# Patient Record
Sex: MALE | Race: WHITE | NOT HISPANIC OR LATINO | Employment: FULL TIME | URBAN - METROPOLITAN AREA
[De-identification: names, ages, dates, MRNs, and addresses within clinical notes are randomized per-mention and may not be internally consistent; named-entity substitution may affect disease eponyms.]

---

## 2017-11-14 NOTE — PROGRESS NOTES
SUBJECTIVE:                                                    James Rios is a 42 year old male who presents to clinic today for the following health issues:      HPI    Concern - right sided abd pain  Onset: Feburary    Description:   Has had a very light uncomfortable pain in right side since February but in the last few days the sensation has doubled and has now wrapped around his back to the other side as well.   - the only urine changes he notices lately is increased odor, no blood, burning or decrease in flow.       Intensity: moderate    Progression of Symptoms:  worsening    Therapies Tried and outcome: none     Patient states that he has noticed increased odor to urine. Denies changes to stream, increased urgency or frequency.   He is a  he work full time during summer and fall.   He states he drinks about 1/2 pot of coffee per day. Does not drink a lot of water. Drinks about 4-5 beers per day. Denies smoking.     Problem list and histories reviewed & adjusted, as indicated.  Additional history: as documented    BP Readings from Last 3 Encounters:   11/15/17 116/70   11/16/15 130/76   09/10/15 122/80    Wt Readings from Last 3 Encounters:   11/15/17 206 lb (93.4 kg)   11/16/15 194 lb 9.6 oz (88.3 kg)   09/10/15 197 lb 6.4 oz (89.5 kg)        Labs reviewed in EPIC      ROS:  Constitutional, HEENT, cardiovascular, pulmonary, gi and gu systems are negative, except as otherwise noted.      OBJECTIVE:   /70 (BP Location: Left arm, Patient Position: Chair, Cuff Size: Adult Large)  Pulse 84  Temp 98.4  F (36.9  C) (Oral)  Resp 16  Wt 206 lb (93.4 kg)  BMI 28.73 kg/m2  Body mass index is 28.73 kg/(m^2).  GENERAL: healthy, alert and no distress  NECK: no adenopathy, no asymmetry, masses, or scars and thyroid normal to palpation  RESP: lungs clear to auscultation - no rales, rhonchi or wheezes  CV: regular rate and rhythm, normal S1 S2, no S3 or S4, no murmur, click or rub, no peripheral  edema and peripheral pulses strong  ABDOMEN: tenderness right side pain with palpation near T-9 below rib line, milder tenderness over same area on left side.No organomegaly or masses, bowel sounds normal, no palpable or pulsatile masses, no bruits heard, no palpable renal abnormalities  and no scars, striae, dilated veins, rashes, or lesions  MS: no gross musculoskeletal defects noted, no edema  SKIN: no suspicious lesions or rashes  NEURO: Normal strength and tone, mentation intact and speech normal  BACK: no CVA tenderness, no paralumbar tenderness    Diagnostic Test Results:  none     ASSESSMENT/PLAN:     1. Chronic right-sided thoracic back pain  Patient states he is concerned about the cost of care today as he is pain out of pocket. Discussed workup to include CBC to check for anemia, infection, CMP for kidney and liver evaluation as well as electrolyte and glucose.  - Comprehensive metabolic panel  - CBC with platelets and differential    2. Acute left-sided thoracic back pain    - Comprehensive metabolic panel  - CBC with platelets and differential    3. Bad odor of urine  We'll rule out urinary tract infection  - *UA reflex to Microscopic and Culture ([place] [facility] (except [place] and [place])    Discussed that he may need imaging done if lab workup is negative. Would recommend abdominal ultrasound for further evaluation of hepatobiliary and renal.   He consumes 5-6 beers daily increasing his rist for liver dysfunction. He is a  and drinks a lot of caffeine increasing risk for renal dysfunction. It is possible that pain is due to costochondritis but it would be atypical to have bilateral locations as he is presenting with.         4. Need for prophylactic vaccination and inoculation against influenza  Declined due to cost    5. Need for prophylactic vaccination with tetanus-diphtheria (TD)  Declined due to cost    6. Need for lipid screening    - Lipid panel reflex to direct LDL  Non-fasting  - Urine Microscopic    Discussed filling out information for mentioned sure as it is open enrollment at this time. Reviewed how to do this with the patient and encouraged him to do this on his own when he has time. He states his girlfriend does have a computer that he is able to use.    Patient Instructions   I will call you with lab result and any additional testing as needed.     Thank you  Jyoti Beregr [facility]

## 2017-11-15 ENCOUNTER — OFFICE VISIT (OUTPATIENT)
Dept: FAMILY MEDICINE | Facility: OTHER | Age: 42
End: 2017-11-15

## 2017-11-15 ENCOUNTER — TELEPHONE (OUTPATIENT)
Dept: FAMILY MEDICINE | Facility: OTHER | Age: 42
End: 2017-11-15

## 2017-11-15 VITALS
RESPIRATION RATE: 16 BRPM | SYSTOLIC BLOOD PRESSURE: 116 MMHG | HEART RATE: 84 BPM | TEMPERATURE: 98.4 F | BODY MASS INDEX: 28.73 KG/M2 | WEIGHT: 206 LBS | DIASTOLIC BLOOD PRESSURE: 70 MMHG

## 2017-11-15 DIAGNOSIS — M54.6 ACUTE LEFT-SIDED THORACIC BACK PAIN: ICD-10-CM

## 2017-11-15 DIAGNOSIS — M54.6 ACUTE LEFT-SIDED THORACIC BACK PAIN: Primary | ICD-10-CM

## 2017-11-15 DIAGNOSIS — Z23 NEED FOR PROPHYLACTIC VACCINATION AND INOCULATION AGAINST INFLUENZA: ICD-10-CM

## 2017-11-15 DIAGNOSIS — M54.6 CHRONIC RIGHT-SIDED THORACIC BACK PAIN: Primary | ICD-10-CM

## 2017-11-15 DIAGNOSIS — Z23 NEED FOR PROPHYLACTIC VACCINATION WITH TETANUS-DIPHTHERIA (TD): ICD-10-CM

## 2017-11-15 DIAGNOSIS — Z13.220 NEED FOR LIPID SCREENING: ICD-10-CM

## 2017-11-15 DIAGNOSIS — M54.6 CHRONIC RIGHT-SIDED THORACIC BACK PAIN: ICD-10-CM

## 2017-11-15 DIAGNOSIS — R82.90 BAD ODOR OF URINE: ICD-10-CM

## 2017-11-15 DIAGNOSIS — G89.29 CHRONIC RIGHT-SIDED THORACIC BACK PAIN: Primary | ICD-10-CM

## 2017-11-15 DIAGNOSIS — G89.29 CHRONIC RIGHT-SIDED THORACIC BACK PAIN: ICD-10-CM

## 2017-11-15 LAB
ALBUMIN SERPL-MCNC: 3.6 G/DL (ref 3.4–5)
ALBUMIN UR-MCNC: 100 MG/DL
ALP SERPL-CCNC: 51 U/L (ref 40–150)
ALT SERPL W P-5'-P-CCNC: 27 U/L (ref 0–70)
ANION GAP SERPL CALCULATED.3IONS-SCNC: 4 MMOL/L (ref 3–14)
APPEARANCE UR: CLEAR
AST SERPL W P-5'-P-CCNC: 14 U/L (ref 0–45)
BASOPHILS # BLD AUTO: 0 10E9/L (ref 0–0.2)
BASOPHILS NFR BLD AUTO: 0.4 %
BILIRUB SERPL-MCNC: 0.6 MG/DL (ref 0.2–1.3)
BILIRUB UR QL STRIP: NEGATIVE
BUN SERPL-MCNC: 18 MG/DL (ref 7–30)
CALCIUM SERPL-MCNC: 9.2 MG/DL (ref 8.5–10.1)
CHLORIDE SERPL-SCNC: 105 MMOL/L (ref 94–109)
CHOLEST SERPL-MCNC: 196 MG/DL
CO2 SERPL-SCNC: 29 MMOL/L (ref 20–32)
COLOR UR AUTO: YELLOW
CREAT SERPL-MCNC: 0.79 MG/DL (ref 0.66–1.25)
DIFFERENTIAL METHOD BLD: ABNORMAL
EOSINOPHIL # BLD AUTO: 0.1 10E9/L (ref 0–0.7)
EOSINOPHIL NFR BLD AUTO: 0.7 %
ERYTHROCYTE [DISTWIDTH] IN BLOOD BY AUTOMATED COUNT: 12.6 % (ref 10–15)
GFR SERPL CREATININE-BSD FRML MDRD: >90 ML/MIN/1.7M2
GLUCOSE SERPL-MCNC: 77 MG/DL (ref 70–99)
GLUCOSE UR STRIP-MCNC: NEGATIVE MG/DL
HCT VFR BLD AUTO: 44.8 % (ref 40–53)
HDLC SERPL-MCNC: 70 MG/DL
HGB BLD-MCNC: 14.9 G/DL (ref 13.3–17.7)
HGB UR QL STRIP: NEGATIVE
KETONES UR STRIP-MCNC: NEGATIVE MG/DL
LDLC SERPL CALC-MCNC: 108 MG/DL
LEUKOCYTE ESTERASE UR QL STRIP: NEGATIVE
LYMPHOCYTES # BLD AUTO: 1.8 10E9/L (ref 0.8–5.3)
LYMPHOCYTES NFR BLD AUTO: 26.2 %
MCH RBC QN AUTO: 33.2 PG (ref 26.5–33)
MCHC RBC AUTO-ENTMCNC: 33.3 G/DL (ref 31.5–36.5)
MCV RBC AUTO: 100 FL (ref 78–100)
MONOCYTES # BLD AUTO: 0.8 10E9/L (ref 0–1.3)
MONOCYTES NFR BLD AUTO: 11.2 %
NEUTROPHILS # BLD AUTO: 4.1 10E9/L (ref 1.6–8.3)
NEUTROPHILS NFR BLD AUTO: 61.5 %
NITRATE UR QL: NEGATIVE
NONHDLC SERPL-MCNC: 126 MG/DL
PH UR STRIP: 6.5 PH (ref 5–7)
PLATELET # BLD AUTO: 290 10E9/L (ref 150–450)
POTASSIUM SERPL-SCNC: 4.8 MMOL/L (ref 3.4–5.3)
PROT SERPL-MCNC: 7.9 G/DL (ref 6.8–8.8)
RBC # BLD AUTO: 4.49 10E12/L (ref 4.4–5.9)
RBC #/AREA URNS AUTO: NORMAL /HPF
SODIUM SERPL-SCNC: 138 MMOL/L (ref 133–144)
SOURCE: ABNORMAL
SP GR UR STRIP: 1.02 (ref 1–1.03)
TRIGL SERPL-MCNC: 88 MG/DL
UROBILINOGEN UR STRIP-ACNC: 0.2 EU/DL (ref 0.2–1)
WBC # BLD AUTO: 6.7 10E9/L (ref 4–11)
WBC #/AREA URNS AUTO: NORMAL /HPF

## 2017-11-15 PROCEDURE — 36415 COLL VENOUS BLD VENIPUNCTURE: CPT | Performed by: NURSE PRACTITIONER

## 2017-11-15 PROCEDURE — 80061 LIPID PANEL: CPT | Performed by: NURSE PRACTITIONER

## 2017-11-15 PROCEDURE — 80053 COMPREHEN METABOLIC PANEL: CPT | Performed by: NURSE PRACTITIONER

## 2017-11-15 PROCEDURE — 99213 OFFICE O/P EST LOW 20 MIN: CPT | Performed by: NURSE PRACTITIONER

## 2017-11-15 PROCEDURE — 81001 URINALYSIS AUTO W/SCOPE: CPT | Performed by: NURSE PRACTITIONER

## 2017-11-15 PROCEDURE — 85025 COMPLETE CBC W/AUTO DIFF WBC: CPT | Performed by: NURSE PRACTITIONER

## 2017-11-15 NOTE — NURSING NOTE
"Chief Complaint   Patient presents with     Flank Pain     Health Maintenance     height, tdap, lipid screening, mychart       Initial /70 (BP Location: Left arm, Patient Position: Chair, Cuff Size: Adult Large)  Pulse 84  Temp 98.4  F (36.9  C) (Oral)  Resp 16  Wt 206 lb (93.4 kg)  BMI 28.73 kg/m2 Estimated body mass index is 28.73 kg/(m^2) as calculated from the following:    Height as of 9/10/15: 5' 11\" (1.803 m).    Weight as of this encounter: 206 lb (93.4 kg).  Medication Reconciliation: complete    "

## 2017-11-15 NOTE — MR AVS SNAPSHOT
"              After Visit Summary   11/15/2017    James Rios    MRN: [id number]           Patient Information     Date Of Birth          1975        Visit Information        Provider Department      11/15/2017 9:20 AM Jyoti Berger APRN CNP [facility]        Today's Diagnoses     Need for lipid screening    -  1    Need for prophylactic vaccination and inoculation against influenza        Need for prophylactic vaccination with tetanus-diphtheria (TD)        Bad odor of urine        Chronic right-sided thoracic back pain        Acute left-sided thoracic back pain          Care Instructions    I will call you with lab result and any additional testing as needed.     Thank you  Jyoti Berger CNP            Follow-ups after your visit        Follow-up notes from your care team     Return if symptoms worsen or fail to improve.      Who to contact     If you have questions or need follow up information about today's clinic visit or your schedule please contact [facility] directly at [phone].  Normal or non-critical lab and imaging results will be communicated to you by MyChart, letter or phone within 4 business days after the clinic has received the results. If you do not hear from us within 7 days, please contact the clinic through SnapHealth[employer] or phone. If you have a critical or abnormal lab result, we will notify you by phone as soon as possible.  Submit refill requests through Errplane or call your pharmacy and they will forward the refill request to us. Please allow 3 business days for your refill to be completed.          Additional Information About Your Visit        Snap[employer]hart Information     Errplane lets you send messages to your doctor, view your test results, renew your prescriptions, schedule appointments and more. To sign up, go to [email or web address] . Click on \"Log in\" on the left side of the screen, which will take you to the Welcome page. Then click " "on \"Sign up Now\" on the right side of the page.     You will be asked to enter the access code listed below, as well as some personal information. Please follow the directions to create your username and password.     Your access code is: [email or web address]  Expires: 2018  9:50 AM     Your access code will  in 90 days. If you need help or a new code, please call your [place] clinic or [phone].        Care EveryWhere ID     This is your Care EveryWhere ID. This could be used by other organizations to access your [place] medical records  [id number]        Your Vitals Were     Pulse Temperature Respirations BMI (Body Mass Index)          84 98.4  F (36.9  C) (Oral) 16 28.73 kg/m2         Blood Pressure from Last 3 Encounters:   11/15/17 116/70   11/16/15 130/76   09/10/15 122/80    Weight from Last 3 Encounters:   11/15/17 206 lb (93.4 kg)   11/16/15 194 lb 9.6 oz (88.3 kg)   09/10/15 197 lb 6.4 oz (89.5 kg)              We Performed the Following     *UA reflex to Microscopic and Culture ([place] and [facility] (except [place] and [place])     CBC with platelets and differential     Comprehensive metabolic panel     Lipid panel reflex to direct LDL Non-fasting        Primary Care Provider Office Phone # Fax #    Ramsey Thompson [phone]       XXX RESIGNED  [address] [facility] [address]        Equal Access to Services     LARISA PENA : Hadii aad ku hadasho Soomaali, waaxda luqadaha, qaybta kaalmada adeegyada, megan jean baptiste. So [facility] [phone].    ATENCIÓN: Si kyleela jcañol, tiene a eller disposición servicios gratuitos de asistencia lingüística. Llame al [phone].    We comply with applicable federal civil rights laws and [place] laws. We do not discriminate on the basis of race, color, national origin, age, disability, sex, sexual orientation, or gender identity.            Thank you!     Thank you for choosing [facility]  for your care. Our goal is always to provide you with excellent care. Hearing back from our patients is one way we can continue to improve our services. Please take a few minutes to complete the written survey that you may receive in the mail after your visit with us. Thank you!             Your Updated Medication List - Protect others around you: Learn how to safely use, store and throw away your medicines at www.disposemymeds.org.      Notice  As of 11/15/2017  9:50 AM    You have not been prescribed any medications.

## 2017-11-15 NOTE — PATIENT INSTRUCTIONS
I will call you with lab result and any additional testing as needed.     Thank you  Jyoti Berger CNP

## 2017-11-15 NOTE — TELEPHONE ENCOUNTER
Pt had called back and he would like to have an ultrasound. Please place orders and call pt when orders are placed.

## 2017-11-15 NOTE — PROGRESS NOTES
Please advise James Rios,  1975, that his lab results were urine negative for infection, cholesterol panel is normal, electrolytes kidneys and liver are all normal, complete blood count does not show any sign of anemia, bacterial or viral infection. Would consider ultrasound if continue to have symptoms.   [phone] (home)   Jyoti Berger

## 2017-11-15 NOTE — TELEPHONE ENCOUNTER
----- Message from Jyoti Berger, JORDYN CNP sent at 11/15/2017  3:25 PM CST -----  Please advise James Rios,  1975, that his lab results were urine negative for infection, cholesterol panel is normal, electrolytes kidneys and liver are all normal, complete blood count does not show any sign of anemia, bacterial or viral infection. Would consider ultrasound if continue to have symptoms.   [phone] (home)   Jyoti Berger

## 2017-11-17 NOTE — TELEPHONE ENCOUNTER
Informed patient that ultra sound orders are in. And he can wait til Dec. 1st when he has insurance. Informed WS of this as well and she was fine with him waiting.

## 2018-03-13 NOTE — PROGRESS NOTES
SUBJECTIVE:   James Rios is a 42 year old male who presents to clinic today for the following health issues:      HPI     FLANK PAIN     Onset: since the Fall (did see WS in November, pt states it's worsening)    Description:   Character: Dull ache  Location: right flank  Radiation: starting to feel it in the other side as well     Intensity: mild, moderate    Progression of Symptoms:  worsening    Accompanying Signs & Symptoms:  Fever/Chills?: no   Gas/Bloating: no   Nausea: no   Vomitting: no   Diarrhea?: no   Constipation:no   Dysuria or Hematuria: urine is strong in odor but color is ok    History:   Trauma: no   Previous similar pain: no    Previous tests done: WS did some labs in Nov, everything was NEG. She did suggest an MRI but pt did not have insurance at the time.     Precipitating factors:   Does the pain change with:     Food: no      BM: no     Urination: no     Alleviating factors:  Drinking more water, unsure if it helps     Therapies Tried and outcome: none     LMP:  not applicable     Problem list and histories reviewed & adjusted, as indicated.  Additional history: as documented    Pain has been worsening in the last week (stronger). Previously was manageable. Pain does not radiate. Good appetite. Uncomfortable to sleep. Not used anything other than beer for pain, it numbs the pain.  Bilateral - R>L     No current outpatient prescriptions on file.     BP Readings from Last 3 Encounters:   03/14/18 112/76   11/15/17 116/70   11/16/15 130/76    Wt Readings from Last 3 Encounters:   03/14/18 207 lb (93.9 kg)   11/15/17 206 lb (93.4 kg)   11/16/15 194 lb 9.6 oz (88.3 kg)                    ROS:  CONSTITUTIONAL: NEGATIVE for fever, chills, change in weight  ENT/MOUTH: NEGATIVE for ear, mouth and throat problems  RESP: NEGATIVE for significant cough or SOB  CV: NEGATIVE for chest pain, palpitations or peripheral edema  GI: NEGATIVE for nausea, abdominal pain, heartburn, or change in bowel  "habits  : negative for dysuria, hematuria, decreased urinary stream, erectile dysfunction and positive for strong urine order.     OBJECTIVE:     /76 (BP Location: Left arm, Patient Position: Chair, Cuff Size: Adult Regular)  Pulse 72  Temp 98.3  F (36.8  C) (Oral)  Resp 16  Ht 5' 10.87\" (1.8 m)  Wt 207 lb (93.9 kg)  BMI 28.98 kg/m2  Body mass index is 28.98 kg/(m^2).  Physical Exam   Constitutional: Vital signs are normal.   Cardiovascular: Normal rate, regular rhythm and normal heart sounds.    Pulmonary/Chest: Effort normal and breath sounds normal.   Abdominal: Soft. Normal appearance and bowel sounds are normal. There is no tenderness. There is no CVA tenderness.   Musculoskeletal:        Lumbar back: He exhibits tenderness and pain. He exhibits normal range of motion, no bony tenderness and no spasm.        Back:    Negative Straight leg raise.    Skin: Skin is warm, dry and intact.         Diagnostic Test Results:  Results for orders placed or performed in visit on 03/14/18   *UA reflex to Microscopic and Culture ([place] and [facility] (except [place] and [place])   Result Value Ref Range    Color Urine Yellow     Appearance Urine Clear     Glucose Urine Negative NEG^Negative mg/dL    Bilirubin Urine Negative NEG^Negative    Ketones Urine Negative NEG^Negative mg/dL    Specific Gravity Urine 1.015 1.003 - 1.035    Blood Urine Negative NEG^Negative    pH Urine 7.0 5.0 - 7.0 pH    Protein Albumin Urine 30 (A) NEG^Negative mg/dL    Urobilinogen Urine 0.2 0.2 - 1.0 EU/dL    Nitrite Urine Negative NEG^Negative    Leukocyte Esterase Urine Negative NEG^Negative    Source Unspecified Urine    PSA, screen   Result Value Ref Range    PSA 1.18 0 - 4 ug/L   Comprehensive metabolic panel   Result Value Ref Range    Sodium 139 133 - 144 mmol/L    Potassium 4.7 3.4 - 5.3 mmol/L    Chloride 103 94 - 109 mmol/L    Carbon Dioxide 26 20 - 32 mmol/L    Anion Gap 10 3 - 14 mmol/L    Glucose 102 (H) 70 - 99 " mg/dL    Urea Nitrogen 23 7 - 30 mg/dL    Creatinine 0.78 0.66 - 1.25 mg/dL    GFR Estimate >90 >60 mL/min/1.7m2    GFR Estimate If Black >90 >60 mL/min/1.7m2    Calcium 9.2 8.5 - 10.1 mg/dL    Bilirubin Total 0.6 0.2 - 1.3 mg/dL    Albumin 4.2 3.4 - 5.0 g/dL    Protein Total 8.7 6.8 - 8.8 g/dL    Alkaline Phosphatase 51 40 - 150 U/L    ALT 38 0 - 70 U/L    AST 22 0 - 45 U/L   CBC with platelets   Result Value Ref Range    WBC 7.3 4.0 - 11.0 10e9/L    RBC Count 4.67 4.4 - 5.9 10e12/L    Hemoglobin 15.4 13.3 - 17.7 g/dL    Hematocrit 46.3 40.0 - 53.0 %    MCV 99 78 - 100 fl    MCH 33.0 26.5 - 33.0 pg    MCHC 33.3 31.5 - 36.5 g/dL    RDW 12.9 10.0 - 15.0 %    Platelet Count 278 150 - 450 10e9/L   Urine Microscopic   Result Value Ref Range    WBC Urine 0 - 5 OTO5^0 - 5 /HPF    RBC Urine O - 2 OTO2^O - 2 /HPF       ASSESSMENT/PLAN:         ICD-10-CM    1. Flank pain R10.9 *UA reflex to Microscopic and Culture ([place] and [facility] (except [place] and [place])     PSA, screen     Comprehensive metabolic panel     CBC with platelets     Urine Microscopic       - Ongoing chronic flank pain. Patient requesting MRI. At this time I do not see a need for this as there is no spinal tenderness. I do recommend an abdominal ultrasound or CT. Given this is chronic I have advised for a CT. No elevated white count which is reassuring and urine is normal. Pain could be musculoskeletal at this time. Continue with tylenol, ice or heat and follow up for imaging.         The patient indicates understanding of these issues and agrees with the plan.    There are no Patient Instructions on file for this visit.    JORDYN Bell [facility]

## 2018-03-14 ENCOUNTER — TELEPHONE (OUTPATIENT)
Dept: FAMILY MEDICINE | Facility: OTHER | Age: 43
End: 2018-03-14

## 2018-03-14 ENCOUNTER — OFFICE VISIT (OUTPATIENT)
Dept: FAMILY MEDICINE | Facility: OTHER | Age: 43
End: 2018-03-14
Payer: COMMERCIAL

## 2018-03-14 VITALS
BODY MASS INDEX: 28.98 KG/M2 | TEMPERATURE: 98.3 F | RESPIRATION RATE: 16 BRPM | WEIGHT: 207 LBS | SYSTOLIC BLOOD PRESSURE: 112 MMHG | DIASTOLIC BLOOD PRESSURE: 76 MMHG | HEART RATE: 72 BPM | HEIGHT: 71 IN

## 2018-03-14 DIAGNOSIS — R10.9 FLANK PAIN: Primary | ICD-10-CM

## 2018-03-14 DIAGNOSIS — R10.9 CHRONIC FLANK PAIN: Primary | ICD-10-CM

## 2018-03-14 DIAGNOSIS — G89.29 CHRONIC FLANK PAIN: Primary | ICD-10-CM

## 2018-03-14 LAB
ALBUMIN SERPL-MCNC: 4.2 G/DL (ref 3.4–5)
ALBUMIN UR-MCNC: 30 MG/DL
ALP SERPL-CCNC: 51 U/L (ref 40–150)
ALT SERPL W P-5'-P-CCNC: 38 U/L (ref 0–70)
ANION GAP SERPL CALCULATED.3IONS-SCNC: 10 MMOL/L (ref 3–14)
APPEARANCE UR: CLEAR
AST SERPL W P-5'-P-CCNC: 22 U/L (ref 0–45)
BILIRUB SERPL-MCNC: 0.6 MG/DL (ref 0.2–1.3)
BILIRUB UR QL STRIP: NEGATIVE
BUN SERPL-MCNC: 23 MG/DL (ref 7–30)
CALCIUM SERPL-MCNC: 9.2 MG/DL (ref 8.5–10.1)
CHLORIDE SERPL-SCNC: 103 MMOL/L (ref 94–109)
CO2 SERPL-SCNC: 26 MMOL/L (ref 20–32)
COLOR UR AUTO: YELLOW
CREAT SERPL-MCNC: 0.78 MG/DL (ref 0.66–1.25)
ERYTHROCYTE [DISTWIDTH] IN BLOOD BY AUTOMATED COUNT: 12.9 % (ref 10–15)
GFR SERPL CREATININE-BSD FRML MDRD: >90 ML/MIN/1.7M2
GLUCOSE SERPL-MCNC: 102 MG/DL (ref 70–99)
GLUCOSE UR STRIP-MCNC: NEGATIVE MG/DL
HCT VFR BLD AUTO: 46.3 % (ref 40–53)
HGB BLD-MCNC: 15.4 G/DL (ref 13.3–17.7)
HGB UR QL STRIP: NEGATIVE
KETONES UR STRIP-MCNC: NEGATIVE MG/DL
LEUKOCYTE ESTERASE UR QL STRIP: NEGATIVE
MCH RBC QN AUTO: 33 PG (ref 26.5–33)
MCHC RBC AUTO-ENTMCNC: 33.3 G/DL (ref 31.5–36.5)
MCV RBC AUTO: 99 FL (ref 78–100)
NITRATE UR QL: NEGATIVE
PH UR STRIP: 7 PH (ref 5–7)
PLATELET # BLD AUTO: 278 10E9/L (ref 150–450)
POTASSIUM SERPL-SCNC: 4.7 MMOL/L (ref 3.4–5.3)
PROT SERPL-MCNC: 8.7 G/DL (ref 6.8–8.8)
PSA SERPL-ACNC: 1.18 UG/L (ref 0–4)
RBC # BLD AUTO: 4.67 10E12/L (ref 4.4–5.9)
RBC #/AREA URNS AUTO: NORMAL /HPF
SODIUM SERPL-SCNC: 139 MMOL/L (ref 133–144)
SOURCE: ABNORMAL
SP GR UR STRIP: 1.01 (ref 1–1.03)
UROBILINOGEN UR STRIP-ACNC: 0.2 EU/DL (ref 0.2–1)
WBC # BLD AUTO: 7.3 10E9/L (ref 4–11)
WBC #/AREA URNS AUTO: NORMAL /HPF

## 2018-03-14 PROCEDURE — 80053 COMPREHEN METABOLIC PANEL: CPT | Performed by: NURSE PRACTITIONER

## 2018-03-14 PROCEDURE — 85027 COMPLETE CBC AUTOMATED: CPT | Performed by: NURSE PRACTITIONER

## 2018-03-14 PROCEDURE — G0103 PSA SCREENING: HCPCS | Performed by: NURSE PRACTITIONER

## 2018-03-14 PROCEDURE — 81001 URINALYSIS AUTO W/SCOPE: CPT | Performed by: NURSE PRACTITIONER

## 2018-03-14 PROCEDURE — 99214 OFFICE O/P EST MOD 30 MIN: CPT | Performed by: NURSE PRACTITIONER

## 2018-03-14 PROCEDURE — 36415 COLL VENOUS BLD VENIPUNCTURE: CPT | Performed by: NURSE PRACTITIONER

## 2018-03-14 ASSESSMENT — ANXIETY QUESTIONNAIRES
GAD7 TOTAL SCORE: 0
7. FEELING AFRAID AS IF SOMETHING AWFUL MIGHT HAPPEN: NOT AT ALL
GAD7 TOTAL SCORE: 0
GAD7 TOTAL SCORE: 0
6. BECOMING EASILY ANNOYED OR IRRITABLE: NOT AT ALL
3. WORRYING TOO MUCH ABOUT DIFFERENT THINGS: NOT AT ALL
7. FEELING AFRAID AS IF SOMETHING AWFUL MIGHT HAPPEN: NOT AT ALL
2. NOT BEING ABLE TO STOP OR CONTROL WORRYING: NOT AT ALL
4. TROUBLE RELAXING: NOT AT ALL
5. BEING SO RESTLESS THAT IT IS HARD TO SIT STILL: NOT AT ALL
1. FEELING NERVOUS, ANXIOUS, OR ON EDGE: NOT AT ALL

## 2018-03-14 ASSESSMENT — PATIENT HEALTH QUESTIONNAIRE - PHQ9
10. IF YOU CHECKED OFF ANY PROBLEMS, HOW DIFFICULT HAVE THESE PROBLEMS MADE IT FOR YOU TO DO YOUR WORK, TAKE CARE OF THINGS AT HOME, OR GET ALONG WITH OTHER PEOPLE: NOT DIFFICULT AT ALL
SUM OF ALL RESPONSES TO PHQ QUESTIONS 1-9: 0
SUM OF ALL RESPONSES TO PHQ QUESTIONS 1-9: 0

## 2018-03-14 ASSESSMENT — PAIN SCALES - GENERAL: PAINLEVEL: MILD PAIN (3)

## 2018-03-14 NOTE — MR AVS SNAPSHOT
"              After Visit Summary   3/14/2018    James Rios    MRN: [id number]           Patient Information     Date Of Birth          1975        Visit Information        Provider Department      3/14/2018 2:00 PM Karley Sanchez APRN CNP [facility]         Follow-ups after your visit        Who to contact     If you have questions or need follow up information about today's clinic visit or your schedule please contact [facility] directly at [phone].  Normal or non-critical lab and imaging results will be communicated to you by MyChart, letter or phone within 4 business days after the clinic has received the results. If you do not hear from us within 7 days, please contact the clinic through MyChart or phone. If you have a critical or abnormal lab result, we will notify you by phone as soon as possible.  Submit refill requests through [employer] or call your pharmacy and they will forward the refill request to us. Please allow 3 business days for your refill to be completed.          Additional Information About Your Visit        Care EveryWhere ID     This is your Care EveryWhere ID. This could be used by other organizations to access your [place] medical records  [id number]        Your Vitals Were     Pulse Temperature Respirations Height BMI (Body Mass Index)       72 98.3  F (36.8  C) (Oral) 16 5' 10.87\" (1.8 m) 28.98 kg/m2        Blood Pressure from Last 3 Encounters:   03/14/18 112/76   11/15/17 116/70   11/16/15 130/76    Weight from Last 3 Encounters:   03/14/18 207 lb (93.9 kg)   11/15/17 206 lb (93.4 kg)   11/16/15 194 lb 9.6 oz (88.3 kg)              Today, you had the following     No orders found for display       Primary Care Provider Office Phone # Fax #    Ramsey Thompson [phone]       XXX RESIGNED  [address] [facility] [address]        Equal Access to Services     DILIP PENA AH: arcelia Solares " connie link waxrocio jacklynin hayaakena frankelolivia carlosclifford laalbertakena ah. So [facility] [phone].    ATENCIÓN: Si lorenzo shay, tiene a eller disposición servicios gratuitos de asistencia lingüística. Llame al [phone].    We comply with applicable federal civil rights laws and [place] laws. We do not discriminate on the basis of race, color, national origin, age, disability, sex, sexual orientation, or gender identity.            Thank you!     Thank you for choosing [facility]  for your care. Our goal is always to provide you with excellent care. Hearing back from our patients is one way we can continue to improve our services. Please take a few minutes to complete the written survey that you may receive in the mail after your visit with us. Thank you!             Your Updated Medication List - Protect others around you: Learn how to safely use, store and throw away your medicines at www.disposemymeds.org.      Notice  As of 3/14/2018  2:35 PM    You have not been prescribed any medications.

## 2018-03-14 NOTE — NURSING NOTE
"Chief Complaint   Patient presents with     Abdominal Pain     Panel Management     height, mcyhart, tdap, phq9, BLANKA       Initial /76 (BP Location: Left arm, Patient Position: Chair, Cuff Size: Adult Regular)  Pulse 72  Temp 98.3  F (36.8  C) (Oral)  Resp 16  Ht 5' 10.87\" (1.8 m)  Wt 207 lb (93.9 kg)  BMI 28.98 kg/m2 Estimated body mass index is 28.98 kg/(m^2) as calculated from the following:    Height as of this encounter: 5' 10.87\" (1.8 m).    Weight as of this encounter: 207 lb (93.9 kg).  Medication Reconciliation: complete    "

## 2018-03-15 ASSESSMENT — ANXIETY QUESTIONNAIRES: GAD7 TOTAL SCORE: 0

## 2018-03-15 ASSESSMENT — PATIENT HEALTH QUESTIONNAIRE - PHQ9: SUM OF ALL RESPONSES TO PHQ QUESTIONS 1-9: 0

## 2018-03-15 NOTE — TELEPHONE ENCOUNTER
----- Message from JORDYN Fine CNP sent at 3/14/2018  9:31 PM CDT -----  Please let patient know his labs are all normal. Recommend imaging (see telephone encounter).   JORDYN Bell CNP

## 2018-03-15 NOTE — TELEPHONE ENCOUNTER
I spoke with patient and relayed KV message. He states that he has been doing research and feels that if he has a CT scan and if something is found then he would need to have an MRI anyway and would prefer to just start with an MRI. He also states that his abdominal pain is increasing, currently a 4/10, and is concerned about having to wait until next week to have any kind of imaging. I offered to transfer him to triage but he declined. I informed him that KV is out until Monday so I will route this to the covering providers. I also let him know that I will be flagging it for the RNs to review and one of them will likely call him to assess him symptoms.

## 2018-03-15 NOTE — TELEPHONE ENCOUNTER
lmtc when patient calls back please give him message below and put him through to be scheduled.    Thank you,  Marsha,

## 2018-03-15 NOTE — TELEPHONE ENCOUNTER
Pt returned the call and was read the messages.  He would prefer to do an MRI as tran [facility] had ordered a while ago.  Please call if that order can be placed so he can do that instead.

## 2018-03-15 NOTE — TELEPHONE ENCOUNTER
I am uncertain what he would like an MRI of, I understand that this was evaluated by SOCORRO back in November. After my evaluation, I feel an abdominal CT is best, we could do an ultrasound to save radiation.  He did not have any spinal tenderness and I don't think an abdominal MRI would be beneficial. He has bilateral flank pain. His kidney and chemistry labs normal.       JORDYN Bell CNP

## 2018-03-15 NOTE — TELEPHONE ENCOUNTER
Please let patient know that I have ordered a abdominal CT to be completed when able in the next week.     JORDYN Bell CNP

## 2018-03-15 NOTE — TELEPHONE ENCOUNTER
KV recommends CT based on her exam and lack of spinal tenderness so I would stick with the CT.  An MRI is only good if there is a known mass or if we are questioning a disc herniation in the spine. This may need to wait for KV to address again so will forward to her again as well.     Maury Ewing PA-C

## 2018-03-15 NOTE — TELEPHONE ENCOUNTER
Routing to provider pool to review and advise on scan options as patient was seen by KV yesterday 03/14/2018. Consuelo Alejandra, RN, BSN

## 2018-03-15 NOTE — TELEPHONE ENCOUNTER
Lm for patient to call us back. Labs came back normal. And then KV ordered an abd. Ct scan   [place] [phone] or [place] [phone]

## 2018-03-16 ENCOUNTER — HOSPITAL ENCOUNTER (OUTPATIENT)
Dept: CT IMAGING | Facility: CLINIC | Age: 43
Discharge: HOME | End: 2018-03-16
Attending: NURSE PRACTITIONER | Admitting: NURSE PRACTITIONER
Payer: COMMERCIAL

## 2018-03-16 DIAGNOSIS — G89.29 CHRONIC FLANK PAIN: ICD-10-CM

## 2018-03-16 DIAGNOSIS — R10.9 CHRONIC FLANK PAIN: ICD-10-CM

## 2018-03-16 PROCEDURE — 25000125 ZZHC RX 250: Performed by: RADIOLOGY

## 2018-03-16 PROCEDURE — 74177 CT ABD & PELVIS W/CONTRAST: CPT

## 2018-03-16 PROCEDURE — 25000128 H RX IP 250 OP 636: Performed by: RADIOLOGY

## 2018-03-16 RX ORDER — IOPAMIDOL 755 MG/ML
500 INJECTION, SOLUTION INTRAVASCULAR ONCE
Status: COMPLETED | OUTPATIENT
Start: 2018-03-16 | End: 2018-03-16

## 2018-03-16 RX ADMIN — SODIUM CHLORIDE 60 ML: 9 INJECTION, SOLUTION INTRAVENOUS at 13:54

## 2018-03-16 RX ADMIN — IOPAMIDOL 100 ML: 755 INJECTION, SOLUTION INTRAVENOUS at 13:55

## 2018-03-19 NOTE — PROGRESS NOTES
SUBJECTIVE:   James Rios is a 42 year old male who presents to clinic today for the following health issues:      HPI     RECHECK: FLANK PAIN  (2nd opinion)     Onset: since the Fall 2017    Description:   Character: Dull ache  Location: right flank  Radiation:  side as well     Intensity: mild, moderate    Progression of Symptoms:  worsening    Accompanying Signs & Symptoms:  Fever/Chills?: no   Gas/Bloating: no   Nausea: no   Vomitting: no   Diarrhea?: no   Constipation:no   Dysuria or Hematuria: urine still has strong in odor    History:   Trauma: no   Previous similar pain: no    Previous tests done: WS did some labs in Nov, everything was NEG. She did suggest an MRI but pt did not have insurance at the time.     Precipitating factors:   Does the pain change with:     Food: no      BM: no     Urination: no     Alleviating factors:  Drinking more water, unsure if it helps     Therapies Tried and outcome: none     LMP:  not applicable     Patient was recently seen in clinic and had thorough workup.  Results were reviewed with him today negative for indication of related symptoms.  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  CT scan did indicate mild disc protrusion L5-S1 area however his pain is in the thoracic area more on right than left  States it is bad enough that he is unable to sleep on his right side due to tenderness in that area.  Problem list and histories reviewed & adjusted, as indicated.  Additional history: as documented    Patient Active Problem List   Diagnosis     DEPRESSIVE DISORDER NEC     Rib pain on left side     Chest wall pain     Myositis     Bruising     Hyperlipidemia LDL goal <160     Chronic right-sided thoracic back pain     Acute left-sided thoracic back pain     Past Surgical History:   Procedure Laterality Date     C APPENDECTOMY  1977       Social History    Substance Use Topics     Smoking status: Never Smoker     Smokeless tobacco: Never Used     Alcohol use Yes      Comment: occasional beer     History reviewed. No pertinent family history.      No current outpatient prescriptions on file.     Allergies   Allergen Reactions     No Known Drug Allergies      BP Readings from Last 3 Encounters:   03/20/18 114/76   03/14/18 112/76   11/15/17 116/70    Wt Readings from Last 3 Encounters:   03/20/18 210 lb (95.3 kg)   03/14/18 207 lb (93.9 kg)   11/15/17 206 lb (93.4 kg)                  Labs reviewed in EPIC    ROS:  Constitutional, HEENT, cardiovascular, pulmonary, gi and gu systems are negative, except as otherwise noted.    OBJECTIVE:     /76 (BP Location: Left arm, Patient Position: Chair, Cuff Size: Adult Regular)  Pulse 78  Temp 98.3  F (36.8  C) (Oral)  Resp 16  Wt 210 lb (95.3 kg)  BMI 29.4 kg/m2  Body mass index is 29.4 kg/(m^2).  GENERAL: healthy, alert and no distress  NECK: no adenopathy, no asymmetry, masses, or scars and thyroid normal to palpation  RESP: lungs clear to auscultation - no rales, rhonchi or wheezes  CV: regular rate and rhythm, normal S1 S2, no S3 or S4, no murmur, click or rub, no peripheral edema and peripheral pulses strong  ABDOMEN: soft, nontender, no hepatosplenomegaly, no masses and bowel sounds normal  MS: Patient has point tenderness right thoracic region over latissimus dorsi that radiates from right to left side less prominent on left side.  Area on right side is near T9 and is worse when her sugar is applied to that area.  Patient states he also feels pain when lying flat and right leg raised   NEURO: Normal strength and tone, mentation intact and speech normal        ASSESSMENT/PLAN:     1. Pain in thoracic spine  Commend follow-up with chiropractic and massage therapy as pain is reproducible and appears to be musculoskeletal related. If symptoms continue recommend return to clinic in 6 weeks .  - CHIROPRACTIC  REFERRAL  - MASSAGE THERAPY REFERRAL    2. Strain of latissimus dorsi muscle, initial encounter    - CHIROPRACTIC REFERRAL  - MASSAGE THERAPY REFERRAL    Patient Instructions   Please follow up with chiropractic and massage for treatment of thoracic and latissimus dorsi pain. Use naprosyn (aleve or advil) 2 in am and 2 pm or ibuprofen 600 mg every 6 hours with food this is for inflammation. May alternate heat with ice 10 minutes each 2 times daily.     Return to clinic in 6 weeks if symptoms worsen or do not improve with treatment.     Thank you  Jyoti Berger CNP

## 2018-03-20 ENCOUNTER — OFFICE VISIT (OUTPATIENT)
Dept: FAMILY MEDICINE | Facility: OTHER | Age: 43
End: 2018-03-20
Payer: COMMERCIAL

## 2018-03-20 VITALS
TEMPERATURE: 98.3 F | BODY MASS INDEX: 29.4 KG/M2 | RESPIRATION RATE: 16 BRPM | DIASTOLIC BLOOD PRESSURE: 76 MMHG | SYSTOLIC BLOOD PRESSURE: 114 MMHG | HEART RATE: 78 BPM | WEIGHT: 210 LBS

## 2018-03-20 DIAGNOSIS — S29.012A STRAIN OF LATISSIMUS DORSI MUSCLE, INITIAL ENCOUNTER: ICD-10-CM

## 2018-03-20 DIAGNOSIS — M54.6 PAIN IN THORACIC SPINE: Primary | ICD-10-CM

## 2018-03-20 PROCEDURE — 99213 OFFICE O/P EST LOW 20 MIN: CPT | Performed by: NURSE PRACTITIONER

## 2018-03-20 NOTE — PATIENT INSTRUCTIONS
Please follow up with chiropractic and massage for treatment of thoracic and latissimus dorsi pain. Use naprosyn (aleve or advil) 2 in am and 2 pm or ibuprofen 600 mg every 6 hours with food this is for inflammation. May alternate heat with ice 10 minutes each 2 times daily.     Return to clinic in 6 weeks if symptoms worsen or do not improve with treatment.     Thank you  Jyoti Berger CNP

## 2018-03-20 NOTE — MR AVS SNAPSHOT
After Visit Summary   3/20/2018    James Rios    MRN: [id number]           Patient Information     Date Of Birth          1975        Visit Information        Provider Department      3/20/2018 1:00 PM Jyoti Berger APRN CNP [facility]        Today's Diagnoses     Need for prophylactic vaccination with tetanus-diphtheria (TD)    -  1    Pain in thoracic spine        Strain of latissimus dorsi muscle, initial encounter          Care Instructions    Please follow up with chiropractic and massage for treatment of thoracic and latissimus dorsi pain. Use naprosyn (aleve or advil) 2 in am and 2 pm or ibuprofen 600 mg every 6 hours with food this is for inflammation. May alternate heat with ice 10 minutes each 2 times daily.     Return to clinic in 6 weeks if symptoms worsen or do not improve with treatment.     Thank you  Jyoti Berger CNP            Follow-ups after your visit        Additional Services     CHIROPRACTIC REFERRAL       Your provider has referred you to: [facility] [phone]   [email or web address]    Please be aware that coverage of these services is subject to the terms and limitations of your health insurance plan.  Call member services at your health plan with any benefit or coverage questions.      Please bring the following to your appointment:    >>   Any x-rays, CTs or MRIs which have been performed.  Contact the facility where they were done to arrange for  prior to your scheduled appointment.    >>   List of current medications   >>   This referral request   >>   Any documents/labs given to you for this referral            MASSAGE THERAPY REFERRAL       Please be aware that coverage of these services is subject to the terms and limitations of your health insurance plan.  Call member services at your health plan with any benefit or coverage questions.      Please bring the following to  your appointment:    >>   Any x-rays, CTs or MRIs which have been performed.  Contact the facility where they were done to arrange for  prior to your scheduled appointment.    >>   List of current medications   >>   This referral request   >>   Any documents/labs given to you for this referral                  Who to contact     If you have questions or need follow up information about today's clinic visit or your schedule please contact [facility] directly at [phone].  Normal or non-critical lab and imaging results will be communicated to you by MyChart, letter or phone within 4 business days after the clinic has received the results. If you do not hear from us within 7 days, please contact the clinic through MyChart or phone. If you have a critical or abnormal lab result, we will notify you by phone as soon as possible.  Submit refill requests through [employer] or call your pharmacy and they will forward the refill request to us. Please allow 3 business days for your refill to be completed.          Additional Information About Your Visit        Care EveryWhere ID     This is your Care EveryWhere ID. This could be used by other organizations to access your [place] medical records  [id number]        Your Vitals Were     Pulse Temperature Respirations BMI (Body Mass Index)          78 98.3  F (36.8  C) (Oral) 16 29.4 kg/m2         Blood Pressure from Last 3 Encounters:   03/20/18 114/76   03/14/18 112/76   11/15/17 116/70    Weight from Last 3 Encounters:   03/20/18 210 lb (95.3 kg)   03/14/18 207 lb (93.9 kg)   11/15/17 206 lb (93.4 kg)              We Performed the Following     CHIROPRACTIC REFERRAL     MASSAGE THERAPY REFERRAL        Primary Care Provider Office Phone # Fax #    Ramsey Thompson [phone]       XXX RESIGNED  [address] [facility] [address]        Equal Access to Services     DILIP PENA : arcelia Solares,  megan cunningham monicakena frankelolivia mooney ah. So [facility] [phone].    ATENCIÓN: Si habla laurita, tiene a eller disposición servicios gratuitos de asistencia lingüística. Llame al [phone].    We comply with applicable federal civil rights laws and [place] laws. We do not discriminate on the basis of race, color, national origin, age, disability, sex, sexual orientation, or gender identity.            Thank you!     Thank you for choosing [facility]  for your care. Our goal is always to provide you with excellent care. Hearing back from our patients is one way we can continue to improve our services. Please take a few minutes to complete the written survey that you may receive in the mail after your visit with us. Thank you!             Your Updated Medication List - Protect others around you: Learn how to safely use, store and throw away your medicines at www.disposemymeds.org.      Notice  As of 3/20/2018  1:42 PM    You have not been prescribed any medications.

## 2018-04-12 ENCOUNTER — OFFICE VISIT (OUTPATIENT)
Dept: FAMILY MEDICINE | Facility: OTHER | Age: 43
End: 2018-04-12
Payer: COMMERCIAL

## 2018-04-12 ENCOUNTER — RADIANT APPOINTMENT (OUTPATIENT)
Dept: GENERAL RADIOLOGY | Facility: OTHER | Age: 43
End: 2018-04-12
Attending: PHYSICIAN ASSISTANT
Payer: COMMERCIAL

## 2018-04-12 VITALS
WEIGHT: 213.3 LBS | RESPIRATION RATE: 18 BRPM | TEMPERATURE: 98.7 F | HEART RATE: 70 BPM | DIASTOLIC BLOOD PRESSURE: 78 MMHG | OXYGEN SATURATION: 96 % | BODY MASS INDEX: 29.86 KG/M2 | SYSTOLIC BLOOD PRESSURE: 122 MMHG

## 2018-04-12 DIAGNOSIS — R07.89 CHEST TIGHTNESS: ICD-10-CM

## 2018-04-12 DIAGNOSIS — R06.02 SOB (SHORTNESS OF BREATH): ICD-10-CM

## 2018-04-12 DIAGNOSIS — J35.1 HYPERTROPHY OF TONSILS: ICD-10-CM

## 2018-04-12 DIAGNOSIS — R09.89 CHEST CONGESTION: ICD-10-CM

## 2018-04-12 DIAGNOSIS — R09.89 CHEST CONGESTION: Primary | ICD-10-CM

## 2018-04-12 DIAGNOSIS — R09.81 SINUS CONGESTION: ICD-10-CM

## 2018-04-12 LAB
BASOPHILS # BLD AUTO: 0 10E9/L (ref 0–0.2)
BASOPHILS NFR BLD AUTO: 0.3 %
DEPRECATED S PYO AG THROAT QL EIA: NORMAL
DIFFERENTIAL METHOD BLD: ABNORMAL
EOSINOPHIL # BLD AUTO: 0.1 10E9/L (ref 0–0.7)
EOSINOPHIL NFR BLD AUTO: 0.7 %
ERYTHROCYTE [DISTWIDTH] IN BLOOD BY AUTOMATED COUNT: 13 % (ref 10–15)
HCT VFR BLD AUTO: 45.5 % (ref 40–53)
HGB BLD-MCNC: 15 G/DL (ref 13.3–17.7)
LYMPHOCYTES # BLD AUTO: 1.9 10E9/L (ref 0.8–5.3)
LYMPHOCYTES NFR BLD AUTO: 17.2 %
MCH RBC QN AUTO: 32.7 PG (ref 26.5–33)
MCHC RBC AUTO-ENTMCNC: 33 G/DL (ref 31.5–36.5)
MCV RBC AUTO: 99 FL (ref 78–100)
MONOCYTES # BLD AUTO: 1.1 10E9/L (ref 0–1.3)
MONOCYTES NFR BLD AUTO: 9.9 %
NEUTROPHILS # BLD AUTO: 8.1 10E9/L (ref 1.6–8.3)
NEUTROPHILS NFR BLD AUTO: 71.9 %
PLATELET # BLD AUTO: 323 10E9/L (ref 150–450)
RBC # BLD AUTO: 4.59 10E12/L (ref 4.4–5.9)
SPECIMEN SOURCE: NORMAL
WBC # BLD AUTO: 11.2 10E9/L (ref 4–11)

## 2018-04-12 PROCEDURE — 36415 COLL VENOUS BLD VENIPUNCTURE: CPT | Performed by: PHYSICIAN ASSISTANT

## 2018-04-12 PROCEDURE — 99214 OFFICE O/P EST MOD 30 MIN: CPT | Performed by: PHYSICIAN ASSISTANT

## 2018-04-12 PROCEDURE — 85025 COMPLETE CBC W/AUTO DIFF WBC: CPT | Performed by: PHYSICIAN ASSISTANT

## 2018-04-12 PROCEDURE — 87880 STREP A ASSAY W/OPTIC: CPT | Performed by: PHYSICIAN ASSISTANT

## 2018-04-12 PROCEDURE — 87081 CULTURE SCREEN ONLY: CPT | Performed by: PHYSICIAN ASSISTANT

## 2018-04-12 PROCEDURE — 71046 X-RAY EXAM CHEST 2 VIEWS: CPT | Mod: FY

## 2018-04-12 ASSESSMENT — ANXIETY QUESTIONNAIRES
4. TROUBLE RELAXING: NOT AT ALL
7. FEELING AFRAID AS IF SOMETHING AWFUL MIGHT HAPPEN: NOT AT ALL
1. FEELING NERVOUS, ANXIOUS, OR ON EDGE: NOT AT ALL
6. BECOMING EASILY ANNOYED OR IRRITABLE: NOT AT ALL
7. FEELING AFRAID AS IF SOMETHING AWFUL MIGHT HAPPEN: NOT AT ALL
GAD7 TOTAL SCORE: 0
GAD7 TOTAL SCORE: 0
2. NOT BEING ABLE TO STOP OR CONTROL WORRYING: NOT AT ALL
5. BEING SO RESTLESS THAT IT IS HARD TO SIT STILL: NOT AT ALL
3. WORRYING TOO MUCH ABOUT DIFFERENT THINGS: NOT AT ALL
GAD7 TOTAL SCORE: 0

## 2018-04-12 ASSESSMENT — PATIENT HEALTH QUESTIONNAIRE - PHQ9
SUM OF ALL RESPONSES TO PHQ QUESTIONS 1-9: 0
SUM OF ALL RESPONSES TO PHQ QUESTIONS 1-9: 0

## 2018-04-12 ASSESSMENT — PAIN SCALES - GENERAL: PAINLEVEL: NO PAIN (0)

## 2018-04-12 NOTE — PROGRESS NOTES
SUBJECTIVE:   James Rios is a 42 year old male who presents to clinic today for the following health issues:    HPI  Acute Illness   Acute illness concerns: Cough  Onset: 1 week    Fever: no    Chills/Sweats: YES- Sweating at night with chills    Headache (location?): YES    Sinus Pressure:no    Conjunctivitis:  no    Ear Pain: no    Rhinorrhea: no    Congestion: no    Sore Throat: YES- Post nasal drainage     Cough: YES-Productive     Wheeze: no    Decreased Appetite: no    Nausea: no    Vomiting: no    Diarrhea:  no    Dysuria/Freq.: no    Fatigue/Achiness: YES- Difficulty sleeping laying down    Sick/Strep Exposure: no     Therapies Tried and outcome: Mucinex and Parents old antibiotics    Problem list and histories reviewed & adjusted, as indicated.  Additional history:     Patient Active Problem List   Diagnosis     DEPRESSIVE DISORDER NEC     Rib pain on left side     Chest wall pain     Myositis     Bruising     Hyperlipidemia LDL goal <160     Chronic right-sided thoracic back pain     Acute left-sided thoracic back pain     Past Surgical History:   Procedure Laterality Date     C APPENDECTOMY  1977       Social History   Substance Use Topics     Smoking status: Never Smoker     Smokeless tobacco: Never Used     Alcohol use Yes      Comment: occasional beer     History reviewed. No pertinent family history.      No current outpatient prescriptions on file.     Allergies   Allergen Reactions     No Known Drug Allergies      Recent Labs   Lab Test  03/14/18   1446  11/15/17   0950   LDL   --   108*   HDL   --   70   TRIG   --   88   ALT  38  27   CR  0.78  0.79   GFRESTIMATED  >90  >90   GFRESTBLACK  >90  >90   POTASSIUM  4.7  4.8      BP Readings from Last 3 Encounters:   04/12/18 122/78   03/20/18 114/76   03/14/18 112/76    Wt Readings from Last 3 Encounters:   04/12/18 213 lb 4.8 oz (96.8 kg)   03/20/18 210 lb (95.3 kg)   03/14/18 207 lb (93.9 kg)                  Labs reviewed in  EPIC    ROS:  Constitutional, HEENT, cardiovascular, pulmonary, gi and gu systems are negative, except as otherwise noted.    OBJECTIVE:     /78 (Cuff Size: Adult Large)  Pulse 70  Temp 98.7  F (37.1  C) (Temporal)  Resp 18  Wt 213 lb 4.8 oz (96.8 kg)  SpO2 96%  BMI 29.86 kg/m2  Body mass index is 29.86 kg/(m^2).  GENERAL: healthy, alert and no distress  HENT: normal cephalic/atraumatic, ear canals and TM's normal, nose and mouth without ulcers or lesions, oropharynx clear, oral mucous membranes moist, tonsillar hypertrophy and tonsillar erythema  NECK: no adenopathy, no asymmetry, masses, or scars and trachea midline and normal to palpation  RESP: decreased breath sounds bibasilar  CV: regular rate and rhythm, normal S1 S2, no S3 or S4, no murmur, click or rub, no peripheral edema and peripheral pulses strong  MS: no gross musculoskeletal defects noted, no edema  NEURO: Normal strength and tone, sensory exam grossly normal and mentation intact  PSYCH: mentation appears normal, affect normal/bright    Diagnostic Test Results:  Results for orders placed or performed in visit on 04/12/18 (from the past 24 hour(s))   Rapid strep screen   Result Value Ref Range    Specimen Description Throat     Rapid Strep A Screen       NEGATIVE: No Group A streptococcal antigen detected by immunoassay, await culture report.   CBC with platelets and differential   Result Value Ref Range    WBC 11.2 (H) 4.0 - 11.0 10e9/L    RBC Count 4.59 4.4 - 5.9 10e12/L    Hemoglobin 15.0 13.3 - 17.7 g/dL    Hematocrit 45.5 40.0 - 53.0 %    MCV 99 78 - 100 fl    MCH 32.7 26.5 - 33.0 pg    MCHC 33.0 31.5 - 36.5 g/dL    RDW 13.0 10.0 - 15.0 %    Platelet Count 323 150 - 450 10e9/L    Diff Method Automated Method     % Neutrophils 71.9 %    % Lymphocytes 17.2 %    % Monocytes 9.9 %    % Eosinophils 0.7 %    % Basophils 0.3 %    Absolute Neutrophil 8.1 1.6 - 8.3 10e9/L    Absolute Lymphocytes 1.9 0.8 - 5.3 10e9/L    Absolute Monocytes 1.1  "0.0 - 1.3 10e9/L    Absolute Eosinophils 0.1 0.0 - 0.7 10e9/L    Absolute Basophils 0.0 0.0 - 0.2 10e9/L     Xray - question of early R sided infiltrate.  It will be reviewed by radiology.    ASSESSMENT/PLAN:     BMI:   Estimated body mass index is 29.86 kg/(m^2) as calculated from the following:    Height as of 3/14/18: 5' 10.87\" (1.8 m).    Weight as of this encounter: 213 lb 4.8 oz (96.8 kg).   Weight management plan: Patient was referred to their PCP to discuss a diet and exercise plan.      1. Chest congestion  2. SOB (shortness of breath)  3. Chest tightness  4. Hypertrophy of tonsils  5. Sinus congestion  Labs somewhat concerning, CXR seems to be similar to past, treat expectantly.  - amoxicillin-clavulanate (AUGMENTIN) 875-125 MG per tablet; Take 1 tablet by mouth 2 times daily for 10 days  Dispense: 20 tablet; Refill: 0    ROV PRN    Delta Keenan PA-C  [facility] for HPI/ROS submitted by the patient on 4/12/2018   PHQ9 TOTAL SCORE: 0  BLANKA 7 TOTAL SCORE: 0    "

## 2018-04-12 NOTE — NURSING NOTE
"Chief Complaint   Patient presents with     URI       Initial /78 (Cuff Size: Adult Large)  Pulse 70  Temp 98.7  F (37.1  C) (Temporal)  Resp 18  Wt 213 lb 4.8 oz (96.8 kg)  SpO2 96%  BMI 29.86 kg/m2 Estimated body mass index is 29.86 kg/(m^2) as calculated from the following:    Height as of 3/14/18: 5' 10.87\" (1.8 m).    Weight as of this encounter: 213 lb 4.8 oz (96.8 kg).  Medication Reconciliation: complete  "

## 2018-04-12 NOTE — MR AVS SNAPSHOT
After Visit Summary   4/12/2018    James Rios    MRN: [id number]           Patient Information     Date Of Birth          1975        Visit Information        Provider Department      4/12/2018 11:40 AM Delta Crowe PA-C [facility]        Today's Diagnoses     Chest congestion    -  1    SOB (shortness of breath)        Chest tightness        Hypertrophy of tonsils        Sinus congestion           Follow-ups after your visit        Who to contact     If you have questions or need follow up information about today's clinic visit or your schedule please contact [facility] directly at [phone].  Normal or non-critical lab and imaging results will be communicated to you by MyChart, letter or phone within 4 business days after the clinic has received the results. If you do not hear from us within 7 days, please contact the clinic through MyChart or phone. If you have a critical or abnormal lab result, we will notify you by phone as soon as possible.  Submit refill requests through [employer] or call your pharmacy and they will forward the refill request to us. Please allow 3 business days for your refill to be completed.          Additional Information About Your Visit        Care EveryWhere ID     This is your Care EveryWhere ID. This could be used by other organizations to access your [place] medical records  [id number]        Your Vitals Were     Pulse Temperature Respirations Pulse Oximetry BMI (Body Mass Index)       70 98.7  F (37.1  C) (Temporal) 18 96% 29.86 kg/m2        Blood Pressure from Last 3 Encounters:   04/12/18 122/78   03/20/18 114/76   03/14/18 112/76    Weight from Last 3 Encounters:   04/12/18 213 lb 4.8 oz (96.8 kg)   03/20/18 210 lb (95.3 kg)   03/14/18 207 lb (93.9 kg)              We Performed the Following     Beta strep group A culture     CBC with platelets and differential     Rapid strep screen          Today's Medication Changes           These changes are accurate as of 4/12/18 12:17 PM.  If you have any questions, ask your nurse or doctor.               Start taking these medicines.        Dose/Directions    amoxicillin-clavulanate 875-125 MG per tablet   Commonly known as:  AUGMENTIN   Used for:  Sinus congestion, Hypertrophy of tonsils, Chest tightness, SOB (shortness of breath), Chest congestion   Started by:  Delta Crowe PA-C        Dose:  1 tablet   Take 1 tablet by mouth 2 times daily for 10 days   Quantity:  20 tablet   Refills:  0            Where to get your medicines      These medications were sent to [facility] - [address]   [address] [place] [address]     Phone:  [phone]     amoxicillin-clavulanate 875-125 MG per tablet                Primary Care Provider Office Phone # Fax #    Ramsey Thompson [phone]       XXX RESIGNED  [address] [facility] [address]        Equal Access to Services     [facility]: Hadii fran ku hadasho Soomaali, waaxda luqadaha, qaybta kaalmada adeegyada, waxay jacklynin haybismark mooney . So [facility] [phone].    ATENCIÓN: Si habla español, tiene a eller disposición servicios gratuitos de asistencia lingüística. [facility] [phone].    We comply with applicable federal civil rights laws and [place] laws. We do not discriminate on the basis of race, color, national origin, age, disability, sex, sexual orientation, or gender identity.            Thank you!     Thank you for choosing [facility]  for your care. Our goal is always to provide you with excellent care. Hearing back from our patients is one way we can continue to improve our services. Please take a few minutes to complete the written survey that you may receive in the mail after your visit with us. Thank you!             Your Updated Medication List - Protect others around you: Learn how to safely use, store and throw away your  medicines at www.disposemymeds.org.          This list is accurate as of 4/12/18 12:17 PM.  Always use your most recent med list.                   Brand Name Dispense Instructions for use Diagnosis    amoxicillin-clavulanate 875-125 MG per tablet    AUGMENTIN    20 tablet    Take 1 tablet by mouth 2 times daily for 10 days    Sinus congestion, Hypertrophy of tonsils, Chest tightness, SOB (shortness of breath), Chest congestion

## 2018-04-13 LAB
BACTERIA SPEC CULT: NORMAL
SPECIMEN SOURCE: NORMAL

## 2018-04-13 ASSESSMENT — PATIENT HEALTH QUESTIONNAIRE - PHQ9: SUM OF ALL RESPONSES TO PHQ QUESTIONS 1-9: 0

## 2018-04-13 ASSESSMENT — ANXIETY QUESTIONNAIRES: GAD7 TOTAL SCORE: 0

## 2019-03-27 ENCOUNTER — TELEPHONE (OUTPATIENT)
Dept: FAMILY MEDICINE | Facility: OTHER | Age: 44
End: 2019-03-27

## 2019-03-27 NOTE — TELEPHONE ENCOUNTER
"James Rios is a 43 year old male who calls with chest pain.    NURSING ASSESSMENT:  Description:  I spoke with the pt who states he  \"feels uncomfortable over my heart\". Constant.  Pain by the elbow in left arm. No SOB, no dizziness, not lightheaded. Feels tired. No nausea or vomiting. No pain in neck, shoulders, neck, jaw or back. No heart palpitation. States he had something similar happen a few months ago and it went away on its own.  Onset/duration:  Couple days  Precip. factors:  Dad had a stent in his heart.   Pain scale (0-10)   Feels like someone is pushing right over his heart     Allergies:   Allergies   Allergen Reactions     No Known Drug Allergies      RECOMMENDED DISPOSITION:  To ED, another person to drive - Chest pain with strong family history.  Will comply with recommendation: Yes  If further questions/concerns or if symptoms do not improve, worsen or new symptoms develop, call your PCP or [facility] as soon as possible.      Guideline used: Chest pain  Telephone Triage Protocols for Nurses, Fifth Edition, Patrica Winston RN    "

## 2019-03-28 ENCOUNTER — HOSPITAL ENCOUNTER (EMERGENCY)
Facility: CLINIC | Age: 44
Discharge: HOME | End: 2019-03-28
Attending: EMERGENCY MEDICINE | Admitting: EMERGENCY MEDICINE
Payer: COMMERCIAL

## 2019-03-28 VITALS
SYSTOLIC BLOOD PRESSURE: 131 MMHG | BODY MASS INDEX: 30.1 KG/M2 | WEIGHT: 215 LBS | TEMPERATURE: 98 F | DIASTOLIC BLOOD PRESSURE: 89 MMHG | HEIGHT: 71 IN | HEART RATE: 77 BPM | RESPIRATION RATE: 26 BRPM | OXYGEN SATURATION: 96 %

## 2019-03-28 DIAGNOSIS — R07.89 CHEST WALL PAIN: ICD-10-CM

## 2019-03-28 LAB
ANION GAP SERPL CALCULATED.3IONS-SCNC: 9 MMOL/L (ref 3–14)
BASOPHILS # BLD AUTO: 0 10E9/L (ref 0–0.2)
BASOPHILS NFR BLD AUTO: 0.4 %
BUN SERPL-MCNC: 15 MG/DL (ref 7–30)
CALCIUM SERPL-MCNC: 8.8 MG/DL (ref 8.5–10.1)
CHLORIDE SERPL-SCNC: 107 MMOL/L (ref 94–109)
CO2 SERPL-SCNC: 22 MMOL/L (ref 20–32)
CREAT SERPL-MCNC: 0.73 MG/DL (ref 0.66–1.25)
DIFFERENTIAL METHOD BLD: NORMAL
EOSINOPHIL NFR BLD AUTO: 0.6 %
ERYTHROCYTE [DISTWIDTH] IN BLOOD BY AUTOMATED COUNT: 12.5 % (ref 10–15)
GFR SERPL CREATININE-BSD FRML MDRD: >90 ML/MIN/{1.73_M2}
GLUCOSE SERPL-MCNC: 115 MG/DL (ref 70–99)
HCT VFR BLD AUTO: 44.9 % (ref 40–53)
HGB BLD-MCNC: 15 G/DL (ref 13.3–17.7)
IMM GRANULOCYTES # BLD: 0 10E9/L (ref 0–0.4)
IMM GRANULOCYTES NFR BLD: 0.1 %
LYMPHOCYTES # BLD AUTO: 1.3 10E9/L (ref 0.8–5.3)
LYMPHOCYTES NFR BLD AUTO: 16 %
MCH RBC QN AUTO: 32.7 PG (ref 26.5–33)
MCHC RBC AUTO-ENTMCNC: 33.4 G/DL (ref 31.5–36.5)
MCV RBC AUTO: 98 FL (ref 78–100)
MONOCYTES # BLD AUTO: 1.3 10E9/L (ref 0–1.3)
MONOCYTES NFR BLD AUTO: 15.7 %
NEUTROPHILS # BLD AUTO: 5.4 10E9/L (ref 1.6–8.3)
NEUTROPHILS NFR BLD AUTO: 67.2 %
NRBC # BLD AUTO: 0 10*3/UL
NRBC BLD AUTO-RTO: 0 /100
PLATELET # BLD AUTO: 289 10E9/L (ref 150–450)
POTASSIUM SERPL-SCNC: 4.6 MMOL/L (ref 3.4–5.3)
RBC # BLD AUTO: 4.59 10E12/L (ref 4.4–5.9)
SODIUM SERPL-SCNC: 138 MMOL/L (ref 133–144)
TROPONIN I SERPL-MCNC: <0.015 UG/L (ref 0–0.04)
WBC # BLD AUTO: 8 10E9/L (ref 4–11)

## 2019-03-28 PROCEDURE — 80048 BASIC METABOLIC PNL TOTAL CA: CPT | Performed by: EMERGENCY MEDICINE

## 2019-03-28 PROCEDURE — 85025 COMPLETE CBC W/AUTO DIFF WBC: CPT | Performed by: EMERGENCY MEDICINE

## 2019-03-28 PROCEDURE — 84484 ASSAY OF TROPONIN QUANT: CPT | Performed by: EMERGENCY MEDICINE

## 2019-03-28 PROCEDURE — 93010 ELECTROCARDIOGRAM REPORT: CPT | Mod: Z6 | Performed by: EMERGENCY MEDICINE

## 2019-03-28 PROCEDURE — 25000132 ZZH RX MED GY IP 250 OP 250 PS 637: Performed by: EMERGENCY MEDICINE

## 2019-03-28 PROCEDURE — 99284 EMERGENCY DEPT VISIT MOD MDM: CPT | Performed by: EMERGENCY MEDICINE

## 2019-03-28 PROCEDURE — 99284 EMERGENCY DEPT VISIT MOD MDM: CPT | Mod: 25 | Performed by: EMERGENCY MEDICINE

## 2019-03-28 PROCEDURE — 93005 ELECTROCARDIOGRAM TRACING: CPT | Performed by: EMERGENCY MEDICINE

## 2019-03-28 RX ORDER — ASPIRIN 81 MG/1
324 TABLET, CHEWABLE ORAL ONCE
Status: COMPLETED | OUTPATIENT
Start: 2019-03-28 | End: 2019-03-28

## 2019-03-28 RX ADMIN — ASPIRIN 81 MG 324 MG: 81 TABLET ORAL at 08:43

## 2019-03-28 ASSESSMENT — MIFFLIN-ST. JEOR: SCORE: 1882.36

## 2019-03-28 NOTE — ED TRIAGE NOTES
Presents with concerns for L) sided chest pressure for the past 2-3 days. He reports pain is constant but increases in intensity. Denies SOA, nausea or diaphoresis. Moriah Holguin RN

## 2019-03-28 NOTE — ED AVS SNAPSHOT
[facility] Emergency Department  [address] [place] [address]  Phone:  [phone]  Fax:  [phone]                                    James Rios   MRN: [id number]    Department:  [facility] Emergency Department   Date of Visit:  3/28/2019           After Visit Summary Signature Page    I have received my discharge instructions, and my questions have been answered. I have discussed any challenges I see with this plan with the nurse or doctor.    ..........................................................................................................................................  Patient/Patient Representative Signature      ..........................................................................................................................................  Patient Representative Print Name and Relationship to Patient    ..................................................               ................................................  Date                                   Time    ..........................................................................................................................................  Reviewed by Signature/Title    ...................................................              ..............................................  Date                                               Time          22EPIC Rev 08/18

## 2019-03-28 NOTE — ED PROVIDER NOTES
"  History     Chief Complaint   Patient presents with     Chest Pain     HPI  James Rios is a 43 year old male who presents to the Ed complaining of chest pain. The pain is \"uncomfortable\", today its light in intensity 2/10, yesterday 4/10.  Its been constantly there for 4 days without radiation.  Worse with sleeping on that side. No sob, diaphoresis or nausea. No coughing.  He started having a runny nose today.  Tried advil without relief.  Arm hurts a little and worse with moving and rolling a tarp over. Is scared this could be his heart.  He also had some pains in the right arm too that came and went.     fam hx of CAD.  Father had a stent at 65. Grandpa  young of MI. Mother, sibs without significant cardiac disease. Non smoker. He is a  but no history of leg swelling, pain, pleuritic type chest pain.     Allergies:  Allergies   Allergen Reactions     No Known Drug Allergies        Problem List:    Patient Active Problem List    Diagnosis Date Noted     Chronic right-sided thoracic back pain 11/15/2017     Priority: Medium     Acute left-sided thoracic back pain 11/15/2017     Priority: Medium     Hyperlipidemia LDL goal <160 09/15/2015     Priority: Medium     Rib pain on left side 09/10/2015     Priority: Medium     Chest wall pain 09/10/2015     Priority: Medium     left precordial       Myositis 09/10/2015     Priority: Medium     Bruising 09/10/2015     Priority: Medium     left breast       DEPRESSIVE DISORDER NEC 04/10/2006     Priority: Medium        Past Medical History:    Past Medical History:   Diagnosis Date     Bruising 9/10/2015     Chest wall pain 9/10/2015     Depressive disorder, not elsewhere classified      Hyperlipidemia LDL goal <160 9/15/2015     Myositis 9/10/2015     Rib pain on left side 9/10/2015       Past Surgical History:    Past Surgical History:   Procedure Laterality Date     C APPENDECTOMY         Family History:    No family history on file.    Social " "History:  Marital Status:  Single [1]  Social History     Tobacco Use     Smoking status: Never Smoker     Smokeless tobacco: Never Used   Substance Use Topics     Alcohol use: Yes     Comment: occasional beer     Drug use: No        Medications:      No current outpatient medications on file.      Review of Systems   All other systems reviewed and are negative.      Physical Exam   BP: (!) 143/99  Pulse: 82  Temp: 98  F (36.7  C)  Resp: 16  Height: 180.3 cm (5' 11\")  Weight: 97.5 kg (215 lb)  SpO2: 98 %      Physical Exam   Constitutional: He is oriented to person, place, and time. He appears well-developed and well-nourished. No distress.   HENT:   Head: Normocephalic and atraumatic.   Eyes: No scleral icterus.   Neck: Normal range of motion. Neck supple.   Cardiovascular: Normal rate and regular rhythm.   Pulmonary/Chest: Effort normal and breath sounds normal. He exhibits tenderness.   Reproducible chest wall pain   Musculoskeletal: Normal range of motion. He exhibits no edema, tenderness or deformity.   Neurological: He is alert and oriented to person, place, and time.   Skin: Skin is warm and dry. No rash noted. He is not diaphoretic.   Nursing note and vitals reviewed.      ED Course        Procedures               EKG Interpretation:      Interpreted by Manuel Holden  Time reviewed: 0841  Symptoms at time of EKG: chest pain  Rhythm: normal sinus   Rate: normal  Axis: normal  Ectopy: none  Conduction: normal  ST Segments/ T Waves: No ST-T wave changes  Q Waves: none  Comparison to prior: No old EKG available    Clinical Impression: normal EKG                 Results for orders placed or performed during the hospital encounter of 03/28/19 (from the past 24 hour(s))   Basic metabolic panel   Result Value Ref Range    Sodium 138 133 - 144 mmol/L    Potassium 4.6 3.4 - 5.3 mmol/L    Chloride 107 94 - 109 mmol/L    Carbon Dioxide 22 20 - 32 mmol/L    Anion Gap 9 3 - 14 mmol/L    Glucose 115 (H) 70 - 99 mg/dL    " Urea Nitrogen 15 7 - 30 mg/dL    Creatinine 0.73 0.66 - 1.25 mg/dL    GFR Estimate >90 >60 mL/min/[1.73_m2]    GFR Estimate If Black >90 >60 mL/min/[1.73_m2]    Calcium 8.8 8.5 - 10.1 mg/dL   Troponin I   Result Value Ref Range    Troponin I ES <0.015 0.000 - 0.045 ug/L   CBC with platelets differential   Result Value Ref Range    WBC 8.0 4.0 - 11.0 10e9/L    RBC Count 4.59 4.4 - 5.9 10e12/L    Hemoglobin 15.0 13.3 - 17.7 g/dL    Hematocrit 44.9 40.0 - 53.0 %    MCV 98 78 - 100 fl    MCH 32.7 26.5 - 33.0 pg    MCHC 33.4 31.5 - 36.5 g/dL    RDW 12.5 10.0 - 15.0 %    Platelet Count 289 150 - 450 10e9/L    Diff Method Automated Method     % Neutrophils 67.2 %    % Lymphocytes 16.0 %    % Monocytes 15.7 %    % Eosinophils 0.6 %    % Basophils 0.4 %    % Immature Granulocytes 0.1 %    Nucleated RBCs 0 0 /100    Absolute Neutrophil 5.4 1.6 - 8.3 10e9/L    Absolute Lymphocytes 1.3 0.8 - 5.3 10e9/L    Absolute Monocytes 1.3 0.0 - 1.3 10e9/L    Absolute Basophils 0.0 0.0 - 0.2 10e9/L    Abs Immature Granulocytes 0.0 0 - 0.4 10e9/L    Absolute Nucleated RBC 0.0        Medications   aspirin (ASA) chewable tablet 324 mg (324 mg Oral Given 3/28/19 0843)       Assessments & Plan (with Medical Decision Making)  Wall pain with a reassuring EKG and a negative troponin.  Recommended follow-up in the clinic and possible outpatient stress testing.  Return to ER precautions were discussed.     I have reviewed the nursing notes.    I have reviewed the findings, diagnosis, plan and need for follow up with the patient.         Medication List      There are no discharge medications for this visit.         Final diagnoses:   Chest wall pain       3/28/2019   [facility] EMERGENCY DEPARTMENT     Manuel Holden MD  03/28/19 5800

## 2019-03-28 NOTE — DISCHARGE INSTRUCTIONS
Return to the ER if new or worsening symptoms. Take ibuprofen for pain. Follow up with your doctor and consider having an outpatient stress test.  Troponin and ekg was normal.

## 2020-02-07 ENCOUNTER — OFFICE VISIT (OUTPATIENT)
Dept: FAMILY MEDICINE | Facility: OTHER | Age: 45
End: 2020-02-07
Payer: COMMERCIAL

## 2020-02-07 VITALS
BODY MASS INDEX: 29.68 KG/M2 | HEIGHT: 71 IN | RESPIRATION RATE: 16 BRPM | HEART RATE: 84 BPM | DIASTOLIC BLOOD PRESSURE: 80 MMHG | SYSTOLIC BLOOD PRESSURE: 132 MMHG | TEMPERATURE: 98.8 F | OXYGEN SATURATION: 97 % | WEIGHT: 212 LBS

## 2020-02-07 DIAGNOSIS — L92.9: Primary | ICD-10-CM

## 2020-02-07 PROCEDURE — 99212 OFFICE O/P EST SF 10 MIN: CPT | Performed by: NURSE PRACTITIONER

## 2020-02-07 ASSESSMENT — ANXIETY QUESTIONNAIRES
5. BEING SO RESTLESS THAT IT IS HARD TO SIT STILL: NOT AT ALL
GAD7 TOTAL SCORE: 0
3. WORRYING TOO MUCH ABOUT DIFFERENT THINGS: NOT AT ALL
6. BECOMING EASILY ANNOYED OR IRRITABLE: NOT AT ALL
GAD7 TOTAL SCORE: 0
GAD7 TOTAL SCORE: 0
4. TROUBLE RELAXING: NOT AT ALL
1. FEELING NERVOUS, ANXIOUS, OR ON EDGE: NOT AT ALL
7. FEELING AFRAID AS IF SOMETHING AWFUL MIGHT HAPPEN: NOT AT ALL
2. NOT BEING ABLE TO STOP OR CONTROL WORRYING: NOT AT ALL
7. FEELING AFRAID AS IF SOMETHING AWFUL MIGHT HAPPEN: NOT AT ALL

## 2020-02-07 ASSESSMENT — MIFFLIN-ST. JEOR: SCORE: 1863.76

## 2020-02-07 NOTE — PROGRESS NOTES
Subjective     James Rios is a 44 year old male who presents to clinic today for the following health issues:  Answers for HPI/ROS submitted by the patient on 2/7/2020   If you checked off any problems, how difficult have these problems made it for you to do your work, take care of things at home, or get along with other people?: Not difficult at all  PHQ9 TOTAL SCORE: 0  BLANKA 7 TOTAL SCORE: 0    HPI   Concern - derm spot left cheek  Onset: about a year    Description:   Had a small black area on cheek that has grown and started to itch occasionally recently .  States area is not painful, does not bleed, has been present for > 1 year but recently has increased in size.       Patient Active Problem List   Diagnosis     DEPRESSIVE DISORDER NEC     Rib pain on left side     Chest wall pain     Myositis     Bruising     Hyperlipidemia LDL goal <160     Chronic right-sided thoracic back pain     Acute left-sided thoracic back pain     Past Surgical History:   Procedure Laterality Date     C APPENDECTOMY  1977       Social History     Tobacco Use     Smoking status: Never Smoker     Smokeless tobacco: Never Used   Substance Use Topics     Alcohol use: Yes     Comment: occasional beer     History reviewed. No pertinent family history.      No current outpatient medications on file.     Allergies   Allergen Reactions     No Known Drug Allergies      Recent Labs   Lab Test 03/28/19  0851 03/14/18  1446 11/15/17  0950   LDL  --   --  108*   HDL  --   --  70   TRIG  --   --  88   ALT  --  38 27   CR 0.73 0.78 0.79   GFRESTIMATED >90 >90 >90   GFRESTBLACK >90 >90 >90   POTASSIUM 4.6 4.7 4.8      BP Readings from Last 3 Encounters:   02/07/20 132/80   03/28/19 131/89   04/12/18 122/78    Wt Readings from Last 3 Encounters:   02/07/20 96.2 kg (212 lb)   03/28/19 97.5 kg (215 lb)   04/12/18 96.8 kg (213 lb 4.8 oz)         Reviewed and updated as needed this visit by Provider      Review of Systems   ROS COMP: Constitutional,  "HEENT, cardiovascular, pulmonary, GI, , musculoskeletal, neuro, skin, endocrine and psych systems are negative, except as otherwise noted.      Objective    /80   Pulse 84   Temp 98.8  F (37.1  C) (Temporal)   Resp 16   Ht 1.803 m (5' 11\")   Wt 96.2 kg (212 lb)   SpO2 97%   BMI 29.57 kg/m    Body mass index is 29.57 kg/m .  Physical Exam   GENERAL: healthy, alert and no distress  NECK: no adenopathy, no asymmetry, masses, or scars and thyroid normal to palpation  RESP: lungs clear to auscultation - no rales, rhonchi or wheezes  CV: regular rate and rhythm, normal S1 S2, no S3 or S4, no murmur, click or rub, no peripheral edema and peripheral pulses strong  SKIN: left cheek small keratin granuloma 4 mm Dr. Burns also looked at area and confirmed he was able to remove granuloma with wooden cotton stick without issue. Patient tolerated well.     Assessment & Plan     1. Keratin granuloma  As noted above Dr. Burns asked to assess as well and he was able to remove granuloma patient will monitor closely if does not clear or if area worsens will return to clinic for further evaluation.        BMI:   Estimated body mass index is 29.57 kg/m  as calculated from the following:    Height as of this encounter: 1.803 m (5' 11\").    Weight as of this encounter: 96.2 kg (212 lb).   Weight management plan: Discussed healthy diet and exercise guidelines        Patient Instructions   If symptoms do not improve or if lesion returns please return to clinic for further evaluation.     Thank you  Jyoti Berger [facility]"

## 2020-02-07 NOTE — PATIENT INSTRUCTIONS
If symptoms do not improve or if lesion returns please return to clinic for further evaluation.     Thank you  Jyoti Berger CNP

## 2020-02-08 ASSESSMENT — PATIENT HEALTH QUESTIONNAIRE - PHQ9: SUM OF ALL RESPONSES TO PHQ QUESTIONS 1-9: 0

## 2020-02-08 ASSESSMENT — ANXIETY QUESTIONNAIRES: GAD7 TOTAL SCORE: 0
